# Patient Record
Sex: FEMALE | Race: BLACK OR AFRICAN AMERICAN | Employment: FULL TIME | URBAN - METROPOLITAN AREA
[De-identification: names, ages, dates, MRNs, and addresses within clinical notes are randomized per-mention and may not be internally consistent; named-entity substitution may affect disease eponyms.]

---

## 2020-11-11 ENCOUNTER — TELEPHONE (OUTPATIENT)
Dept: INTERNAL MEDICINE CLINIC | Age: 60
End: 2020-11-11

## 2020-11-11 NOTE — TELEPHONE ENCOUNTER
Eva Negron would like to know if DR Judi Bach will see his wife as a NP. She is not currently having any issues, just needs to establish with a PCP. She has [employer] insurance.

## 2020-12-11 ENCOUNTER — OFFICE VISIT (OUTPATIENT)
Dept: INTERNAL MEDICINE CLINIC | Age: 60
End: 2020-12-11
Payer: COMMERCIAL

## 2020-12-11 VITALS
WEIGHT: 77 LBS | SYSTOLIC BLOOD PRESSURE: 104 MMHG | HEART RATE: 68 BPM | DIASTOLIC BLOOD PRESSURE: 66 MMHG | TEMPERATURE: 96.2 F

## 2020-12-11 PROBLEM — H10.13 ALLERGIC CONJUNCTIVITIS OF BOTH EYES: Status: ACTIVE | Noted: 2020-12-11

## 2020-12-11 PROCEDURE — 99203 OFFICE O/P NEW LOW 30 MIN: CPT | Performed by: INTERNAL MEDICINE

## 2020-12-11 RX ORDER — OLOPATADINE HYDROCHLORIDE 1 MG/ML
1 SOLUTION/ DROPS OPHTHALMIC 2 TIMES DAILY
Qty: 5 ML | Refills: 5 | Status: SHIPPED | OUTPATIENT
Start: 2020-12-11 | End: 2021-01-10

## 2020-12-11 ASSESSMENT — PATIENT HEALTH QUESTIONNAIRE - PHQ9
SUM OF ALL RESPONSES TO PHQ QUESTIONS 1-9: 0
1. LITTLE INTEREST OR PLEASURE IN DOING THINGS: 0
SUM OF ALL RESPONSES TO PHQ9 QUESTIONS 1 & 2: 0
SUM OF ALL RESPONSES TO PHQ QUESTIONS 1-9: 0
2. FEELING DOWN, DEPRESSED OR HOPELESS: 0
SUM OF ALL RESPONSES TO PHQ QUESTIONS 1-9: 0

## 2020-12-11 NOTE — PROGRESS NOTES
2020    Jaimie Kuhn (:  1960) is a 64 y.o. female, here for evaluation of the following medical concerns:    Eye Problem   Both eyes are affected. This is a chronic problem. The current episode started more than 1 month ago. The problem has been gradually worsening. There was no injury mechanism. The pain is at a severity of 3/10. There is no known exposure to pink eye. She does not wear contacts. Associated symptoms include an eye discharge, eye redness and itching. Associated symptoms comments: pruritis. She has tried nothing for the symptoms. The treatment provided mild relief. Review of Systems   Constitutional: Negative for chills and diaphoresis. HENT: Negative for drooling, ear discharge and ear pain. Eyes: Positive for discharge, redness and itching. Respiratory: Negative for cough, choking and shortness of breath. Endocrine: Negative for heat intolerance and polydipsia. Genitourinary: Negative for enuresis and flank pain. Prior to Visit Medications    Medication Sig Taking? Authorizing Provider   olopatadine (PATANOL) 0.1 % ophthalmic solution Place 1 drop into both eyes 2 times daily Yes Bola Barrett MD        Allergies   Allergen Reactions    Penicillins        History reviewed. No pertinent past medical history.     Past Surgical History:   Procedure Laterality Date     SECTION      4 c-sections       Social History     Socioeconomic History    Marital status:      Spouse name: Not on file    Number of children: Not on file    Years of education: Not on file    Highest education level: Not on file   Occupational History    Not on file   Social Needs    Financial resource strain: Not on file    Food insecurity     Worry: Not on file     Inability: Not on file    Transportation needs     Medical: Not on file     Non-medical: Not on file   Tobacco Use    Smoking status: Never Smoker    Smokeless tobacco: Never Used Substance and Sexual Activity    Alcohol use: Not Currently    Drug use: Never    Sexual activity: Not on file   Lifestyle    Physical activity     Days per week: Not on file     Minutes per session: Not on file    Stress: Not on file   Relationships    Social connections     Talks on phone: Not on file     Gets together: Not on file     Attends Muslim service: Not on file     Active member of club or organization: Not on file     Attends meetings of clubs or organizations: Not on file     Relationship status: Not on file    Intimate partner violence     Fear of current or ex partner: Not on file     Emotionally abused: Not on file     Physically abused: Not on file     Forced sexual activity: Not on file   Other Topics Concern    Not on file   Social History Narrative    Not on file        Family History   Problem Relation Age of Onset    High Blood Pressure Mother     Heart Disease Father     High Blood Pressure Maternal Grandmother     High Blood Pressure Maternal Grandfather        Vitals:    12/11/20 1651   BP: 104/66   Site: Left Upper Arm   Position: Sitting   Cuff Size: Small Adult   Pulse: 68   Temp: 96.2 °F (35.7 °C)   TempSrc: Infrared   Weight: 77 lb (34.9 kg)     There is no height or weight on file to calculate BMI. Physical Exam  Constitutional:       General: She is not in acute distress. Appearance: Normal appearance. She is not ill-appearing. HENT:      Head: Normocephalic and atraumatic. Right Ear: Tympanic membrane and ear canal normal.      Left Ear: Tympanic membrane and ear canal normal.      Nose: Congestion and rhinorrhea present. Mouth/Throat:      Pharynx: No oropharyngeal exudate or posterior oropharyngeal erythema. Eyes:      General: Lids are normal. Lids are everted, no foreign bodies appreciated. Right eye: Discharge present. Left eye: Discharge present. Extraocular Movements:      Right eye: Normal extraocular motion. Left eye: Normal extraocular motion. Conjunctiva/sclera:      Right eye: Chemosis present. Left eye: Chemosis present. Neck:      Musculoskeletal: Normal range of motion. No neck rigidity or muscular tenderness. Cardiovascular:      Rate and Rhythm: Normal rate and regular rhythm. Pulses: Normal pulses. Heart sounds: No murmur. Pulmonary:      Effort: Pulmonary effort is normal. No respiratory distress. Breath sounds: Normal breath sounds. No stridor. No wheezing or rhonchi. Abdominal:      General: Abdomen is flat. There is no distension. Palpations: There is no mass. Tenderness: There is no abdominal tenderness. Hernia: No hernia is present. Skin:     General: Skin is warm. Coloration: Skin is not jaundiced or pale. Neurological:      Mental Status: She is alert. ASSESSMENT/PLAN:  1. Allergic conjunctivitis of both eyes  stable  - olopatadine (PATANOL) 0.1 % ophthalmic solution; Place 1 drop into both eyes 2 times daily  Dispense: 5 mL; Refill: 5  -  Refer to optometry      Return in about 3 months (around 3/11/2021) for Annual Physical.    An  electronic signature was used to authenticate this note.     --Hardik Rendon MD on 12/24/2020 at 7:20 AM

## 2020-12-24 ASSESSMENT — ENCOUNTER SYMPTOMS
COUGH: 0
CHOKING: 0
EYE ITCHING: 1
EYE REDNESS: 1
EYE DISCHARGE: 1
SHORTNESS OF BREATH: 0

## 2022-05-20 ENCOUNTER — HOSPITAL ENCOUNTER (EMERGENCY)
Age: 62
Discharge: HOME | End: 2022-05-20
Payer: COMMERCIAL

## 2022-05-20 VITALS
SYSTOLIC BLOOD PRESSURE: 111 MMHG | TEMPERATURE: 98.2 F | RESPIRATION RATE: 16 BRPM | WEIGHT: 84.5 LBS | OXYGEN SATURATION: 99 % | HEIGHT: 62 IN | DIASTOLIC BLOOD PRESSURE: 75 MMHG | BODY MASS INDEX: 15.55 KG/M2 | HEART RATE: 88 BPM

## 2022-05-20 DIAGNOSIS — S01.81XA CHIN LACERATION, INITIAL ENCOUNTER: Primary | ICD-10-CM

## 2022-05-20 PROCEDURE — 6360000002 HC RX W HCPCS: Performed by: PHYSICIAN ASSISTANT

## 2022-05-20 PROCEDURE — 90471 IMMUNIZATION ADMIN: CPT | Performed by: PHYSICIAN ASSISTANT

## 2022-05-20 PROCEDURE — 90715 TDAP VACCINE 7 YRS/> IM: CPT | Performed by: PHYSICIAN ASSISTANT

## 2022-05-20 PROCEDURE — 99284 EMERGENCY DEPT VISIT MOD MDM: CPT

## 2022-05-20 PROCEDURE — 12014 RPR F/E/E/N/L/M 5.1-7.5 CM: CPT

## 2022-05-20 RX ADMIN — TETANUS TOXOID, REDUCED DIPHTHERIA TOXOID AND ACELLULAR PERTUSSIS VACCINE, ADSORBED 0.5 ML: 5; 2.5; 8; 8; 2.5 SUSPENSION INTRAMUSCULAR at 19:07

## 2022-05-20 ASSESSMENT — ENCOUNTER SYMPTOMS
NAUSEA: 0
ABDOMINAL PAIN: 0
SHORTNESS OF BREATH: 0
DIARRHEA: 0
TROUBLE SWALLOWING: 0
VOICE CHANGE: 0
VOMITING: 0

## 2022-05-21 NOTE — ED PROVIDER NOTES
[facility]        Pt Name: Adrian Crum  MRN: [id number]  [place] 1960  Date of evaluation: 5/20/2022  Provider: Aisha Malagon PA-C  PCP: Del Danielle MD  Note Started: 8:00 PM EDT       SHARON. I have evaluated this patient. My supervising physician was available for consultation. CHIEF COMPLAINT       Chief Complaint   Patient presents with    Laceration     Tripped over a basket at work at [employer] in [facility] and fell and lacerated chin. HISTORY OF PRESENT ILLNESS   (Location, Timing/Onset, Context/Setting, Quality, Duration, Modifying Factors, Severity, Associated Signs and Symptoms)  Note limiting factors. Chief Complaint: chin laceration     Adrian Crum is a 63 y.o. female who presents to the emergency department today for evaluation for a chin laceration which occurred shortly before arriving to the ED. The patient states that she was at [address] [place] [address] [place], she states that she tripped over a basket on the floor, and fell hitting her chin on the ground. Patient denies feeling dizzy, lightheaded, having chest pain or shortness of breath before her fall, her fall was mechanical in nature. She did hit her head however there was no loss of consciousness. No vomiting. She is not on any blood thinners. She denies any headaches or facial pain. She has no visual changes. She has no numbness, tingling or weakness. She has no neck pain. No back pain. She denies feeling dizzy or lightheaded she is able to ambulate, unsure of her last tetanus, no other complaints. Nursing Notes were all reviewed and agreed with or any disagreements were addressed in the HPI. REVIEW OF SYSTEMS    (2-9 systems for level 4, 10 or more for level 5)     Review of Systems   Constitutional: Negative for activity change, appetite change, chills and fever. HENT: Negative for trouble swallowing and voice change. Eyes: Negative for visual disturbance. Respiratory: Negative for shortness of breath. Cardiovascular: Negative for chest pain. Gastrointestinal: Negative for abdominal pain, diarrhea, nausea and vomiting. Genitourinary: Negative for difficulty urinating, dysuria and hematuria. Musculoskeletal: Negative for neck pain and neck stiffness. Skin: Positive for wound. Neurological: Negative for weakness, numbness and headaches. Positives and Pertinent negatives as per HPI. Except as noted above in the ROS, all other systems were reviewed and negative. PAST MEDICAL HISTORY   History reviewed. No pertinent past medical history. SURGICAL HISTORY     Past Surgical History:   Procedure Laterality Date     SECTION      4 c-sections         CURRENTMEDICATIONS       Previous Medications    No medications on file         ALLERGIES     Penicillins    FAMILYHISTORY       Family History   Problem Relation Age of Onset    High Blood Pressure Mother     Heart Disease Father     High Blood Pressure Maternal Grandmother     High Blood Pressure Maternal Grandfather           SOCIAL HISTORY       Social History     Tobacco Use    Smoking status: Never Smoker    Smokeless tobacco: Never Used   Vaping Use    Vaping Use: Never used   Substance Use Topics    Alcohol use: Not Currently    Drug use: Never       SCREENINGS    Mongo Coma Scale  Eye Opening: Spontaneous  Best Verbal Response: Oriented  Best Motor Response: Obeys commands  Landon Coma Scale Score: 15        PHYSICAL EXAM    (up to 7 for level 4, 8 or more for level 5)     ED Triage Vitals [22 1858]   BP Temp Temp Source Pulse Resp SpO2 Height Weight   111/75 98.2 °F (36.8 °C) Oral 88 16 99 % 5' 2\" (1.575 m) 84 lb 8 oz (38.3 kg)       Physical Exam  Vitals and nursing note reviewed. Constitutional:       Appearance: She is well-developed. She is not diaphoretic. HENT:      Head: Normocephalic and atraumatic. Comments: There is no salinas sign or raccoon sign. No CSF rhinorrhea. To the underside of the chin there is a 6 cm laceration noted, gaping although it does approximate well. There is no facial bone tenderness. No salinas sign raccoon sign. No CSF rhinorrhea     Right Ear: External ear normal.      Left Ear: External ear normal.      Nose: Nose normal.      Mouth/Throat:      Mouth: Mucous membranes are moist.      Pharynx: Oropharynx is clear. Eyes:      General:         Right eye: No discharge. Left eye: No discharge. Extraocular Movements: Extraocular movements intact. Conjunctiva/sclera: Conjunctivae normal.      Pupils: Pupils are equal, round, and reactive to light. Neck:      Trachea: No tracheal deviation. Cardiovascular:      Rate and Rhythm: Normal rate and regular rhythm. Heart sounds: No murmur heard. Pulmonary:      Effort: Pulmonary effort is normal. No respiratory distress. Breath sounds: Normal breath sounds. No wheezing or rales. Musculoskeletal:         General: Normal range of motion. Cervical back: Normal range of motion and neck supple. Comments: There is no midline spinal tenderness   Skin:     General: Skin is warm and dry. Neurological:      General: No focal deficit present. Mental Status: She is alert and oriented to person, place, and time. Psychiatric:         Behavior: Behavior normal.         DIAGNOSTIC RESULTS   LABS:    Labs Reviewed - No data to display    When ordered only abnormal lab results are displayed. All other labs were within normal range or not returned as of this dictation. EKG: When ordered, EKG's are interpreted by the Emergency Department Physician in the absence of a cardiologist.  Please see their note for interpretation of EKG.     RADIOLOGY:   Non-plain film images such as CT, Ultrasound and MRI are read by the radiologist. Plain radiographic images are visualized and preliminarily interpreted by the ED Provider with the below findings:        Interpretation per the Radiologist below, if available at the time of this note:    No orders to display     No results found. PROCEDURES   Unless otherwise noted below, none     Lac Repair    Date/Time: 5/20/2022 8:32 PM  Performed by: Edy Bui PA-C  Authorized by: Edy Bui PA-C     Consent:     Consent obtained:  Verbal    Consent given by:  Patient    Risks discussed:  Infection    Alternatives discussed:  No treatment  Anesthesia (see MAR for exact dosages): Anesthesia method:  Local infiltration    Local anesthetic:  Lidocaine 1% WITH epi  Laceration details:     Location:  Face    Face location:  Chin    Length (cm):  6    Depth (mm):  3  Repair type:     Repair type:  Simple  Pre-procedure details:     Preparation:  Patient was prepped and draped in usual sterile fashion  Exploration:     Wound extent: no foreign bodies/material noted, no muscle damage noted, no underlying fracture noted and no vascular damage noted      Contaminated: no    Treatment:     Area cleansed with:  Saline    Amount of cleaning:  Standard    Irrigation solution:  Sterile saline    Visualized foreign bodies/material removed: no    Skin repair:     Repair method:  Sutures    Suture size:  6-0    Suture material:  Nylon    Suture technique:  Simple interrupted    Number of sutures:  6  Approximation:     Approximation:  Close  Post-procedure details:     Dressing:  Open (no dressing)    Patient tolerance of procedure:   Tolerated well, no immediate complications        CRITICAL CARE TIME       CONSULTS:  None      EMERGENCY DEPARTMENT COURSE and DIFFERENTIAL DIAGNOSIS/MDM:   Vitals:    Vitals:    05/20/22 1858   BP: 111/75   Pulse: 88   Resp: 16   Temp: 98.2 °F (36.8 °C)   TempSrc: Oral   SpO2: 99%   Weight: 84 lb 8 oz (38.3 kg)   Height: 5' 2\" (1.575 m)       Patient was given the following medications:  Medications   Tetanus-Diphth-Acell Pertussis (BOOSTRIX) injection 0.5 mL (0.5 mLs IntraMUSCular Given 5/20/22 1906)         Is this patient to be included in the SEP-1 Core Measure due to severe sepsis or septic shock? No   Exclusion criteria - the patient is NOT to be included for SEP-1 Core Measure due to: Infection is not suspected    Briefly, this is a 82-year-old female who presents to the emergency department today for evaluation for chin laceration which occurred today before arriving to the ED. Patient states that she was at [employer], tripped over a basket, fell landing directly on her chin. No loss of consciousness. No vomiting. She is not on any blood thinners    On examination, she has a 6 cm laceration noted to the underside of the chin, she is otherwise neurovascularly intact, and she has no tenderness    The laceration was repaired with 6 sutures by myself, please see procedure note above for additional details    Supportive care discussed at home including wound care, suture removal within 5 days. She is to return to the ED for any new or worsening symptoms. Patient voiced understanding is agreeable with plan. Stable for discharge. No results found for this visit on 05/20/22. I estimate there is LOW risk for SUBARACHNOID HEMORRHAGE, MENINGITIS, INTRACRANIAL HEMORRHAGE, SUBDURAL HEMATOMA, OR STROKE, thus I consider the discharge disposition reasonable. Adrian Crum and I have discussed the diagnosis and risks, and we agree with discharging home to follow-up with their primary doctor. We also discussed returning to the Emergency Department immediately if new or worsening symptoms occur. We have discussed the symptoms which are most concerning (e.g., changing or worsening pain, weakness, vomiting, fever) that necessitate immediate return. Final Impression    1. Chin laceration, initial encounter        Discharge Vital Signs:  Blood pressure 111/75, pulse 88, temperature 98.2 °F (36.8 °C), temperature source Oral, resp.  rate 16, height 5' 2\" (1.575 m), weight 84 lb 8 oz (38.3 kg), SpO2 99 %. [place] Criteria for Head CT Imaging  Imaging is indicated if any of the following are present:  GCS < 15 two hours after injury: No  Suspected open/depressed skull fracture: No  Signs of basilar skull fracture: No  Two or more episodes of vomiting since injury: No  Age 72 years or older: No  Amnesia of time before impact: No  Dangerous mechanism (pedestrian struck by motor vehicle, ejected from MVC, fall >3ft or >5 steps): No  Neurologic deficits on exam: No  Seizures after injury: No  Bleeding diathesis or anticoagulant use: No    Based on the [facility] HCT rules, head CT imaging is not indicated at this time. FINAL IMPRESSION      1.  Chin laceration, initial encounter          DISPOSITION/PLAN   DISPOSITION Decision To Discharge 05/20/2022 07:26:03 PM      PATIENT REFERRED TO:  Hardik Rendon, [address] [facility] [address] [facility] [facility]  [phone]    In 5 days  For suture removal    [facility] Emergency Department  [address] [place] [address]  [phone]    As needed, If symptoms worsen      DISCHARGE MEDICATIONS:  New Prescriptions    No medications on file       DISCONTINUED MEDICATIONS:  Discontinued Medications    No medications on file              (Please note that portions of this note were completed with a voice recognition program.  Efforts were made to edit the dictations but occasionally words are mis-transcribed.)    Lucretia Hutchinson PA-C (electronically signed)            Lucretia Hutchinson PA-C  05/20/22 2034

## 2022-07-06 ENCOUNTER — HOSPITAL ENCOUNTER (EMERGENCY)
Age: 62
Discharge: HOME | End: 2022-07-06
Payer: COMMERCIAL

## 2022-07-06 ENCOUNTER — APPOINTMENT (OUTPATIENT)
Dept: GENERAL RADIOLOGY | Age: 62
End: 2022-07-06
Payer: COMMERCIAL

## 2022-07-06 VITALS
TEMPERATURE: 97.9 F | DIASTOLIC BLOOD PRESSURE: 62 MMHG | OXYGEN SATURATION: 95 % | HEART RATE: 76 BPM | SYSTOLIC BLOOD PRESSURE: 112 MMHG | RESPIRATION RATE: 16 BRPM

## 2022-07-06 DIAGNOSIS — S32.592A CLOSED FRACTURE OF MULTIPLE PUBIC RAMI, LEFT, INITIAL ENCOUNTER (HCC): Primary | ICD-10-CM

## 2022-07-06 DIAGNOSIS — S39.012A LUMBAR STRAIN, INITIAL ENCOUNTER: ICD-10-CM

## 2022-07-06 PROCEDURE — 73552 X-RAY EXAM OF FEMUR 2/>: CPT

## 2022-07-06 PROCEDURE — 99283 EMERGENCY DEPT VISIT LOW MDM: CPT

## 2022-07-06 PROCEDURE — 72100 X-RAY EXAM L-S SPINE 2/3 VWS: CPT

## 2022-07-06 RX ORDER — HYDROCODONE BITARTRATE AND ACETAMINOPHEN 5; 325 MG/1; MG/1
1 TABLET ORAL EVERY 6 HOURS PRN
Qty: 20 TABLET | Refills: 0 | Status: SHIPPED | OUTPATIENT
Start: 2022-07-06 | End: 2022-07-11

## 2022-07-06 ASSESSMENT — ENCOUNTER SYMPTOMS: BACK PAIN: 1

## 2022-07-06 NOTE — Clinical Note
Saima Stoll was seen and treated in our emergency department on 7/6/2022. She may return to work on 07/20/2022. If you have any questions or concerns, please don't hesitate to call.       Jasmin Alvarez PA-C

## 2022-07-06 NOTE — Clinical Note
Thiago Johnson was seen and treated in our emergency department on 7/6/2022. She may return to work on 07/20/2022. If you have any questions or concerns, please don't hesitate to call.       Monisha Eubanks PA-C

## 2022-07-06 NOTE — ED PROVIDER NOTES
history. SURGICAL HISTORY     Past Surgical History:   Procedure Laterality Date     SECTION      4 c-sections         CURRENTMEDICATIONS       Previous Medications    No medications on file         ALLERGIES     Penicillins    FAMILYHISTORY       Family History   Problem Relation Age of Onset    High Blood Pressure Mother     Heart Disease Father     High Blood Pressure Maternal Grandmother     High Blood Pressure Maternal Grandfather           SOCIAL HISTORY       Social History     Tobacco Use    Smoking status: Never Smoker    Smokeless tobacco: Never Used   Vaping Use    Vaping Use: Never used   Substance Use Topics    Alcohol use: Not Currently    Drug use: Never       SCREENINGS             PHYSICAL EXAM    (up to 7 for level 4, 8 or more for level 5)     ED Triage Vitals [22 1315]   BP Temp Temp Source Heart Rate Resp SpO2 Height Weight   112/62 97.9 °F (36.6 °C) Oral 76 16 95 % -- --       Physical Exam  Vitals and nursing note reviewed. Constitutional:       General: She is not in acute distress. Appearance: She is not ill-appearing or toxic-appearing. HENT:      Head: Normocephalic and atraumatic. Right Ear: External ear normal.      Left Ear: External ear normal.      Nose: Nose normal.      Mouth/Throat:      Mouth: Mucous membranes are moist.      Pharynx: Oropharynx is clear. Eyes:      Extraocular Movements: Extraocular movements intact. Conjunctiva/sclera: Conjunctivae normal.      Pupils: Pupils are equal, round, and reactive to light. Cardiovascular:      Rate and Rhythm: Normal rate and regular rhythm. Pulses: Normal pulses. Heart sounds: Normal heart sounds. Pulmonary:      Effort: Pulmonary effort is normal. No respiratory distress. Breath sounds: Normal breath sounds. Abdominal:      General: Abdomen is flat. Bowel sounds are normal. There is no distension. Palpations: Abdomen is soft. Tenderness:  There is no abdominal tenderness. There is no guarding or rebound. Musculoskeletal:         General: Tenderness (lateral left hip and knee) present. No deformity. Normal range of motion. Cervical back: Normal range of motion and neck supple. Lumbar back: Tenderness present. No deformity. Normal range of motion. Negative right straight leg raise test and negative left straight leg raise test.   Skin:     General: Skin is warm and dry. Capillary Refill: Capillary refill takes less than 2 seconds. Neurological:      General: No focal deficit present. Mental Status: She is alert and oriented to person, place, and time. Cranial Nerves: No cranial nerve deficit. Sensory: No sensory deficit. Motor: No weakness. Coordination: Coordination normal.      Gait: Gait normal.   Psychiatric:         Mood and Affect: Mood normal.         Behavior: Behavior normal.         DIAGNOSTIC RESULTS   LABS:    Labs Reviewed - No data to display    When ordered only abnormal lab results are displayed. All other labs were within normal range or not returned as of this dictation. EKG: When ordered, EKG's are interpreted by the Emergency Department Physician in the absence of a cardiologist.  Please see their note for interpretation of EKG. RADIOLOGY:   Non-plain film images such as CT, Ultrasound and MRI are read by the radiologist. Plain radiographic images are visualized and preliminarily interpreted by the ED Provider with the below findings:        Interpretation per the Radiologist below, if available at the time of this note:    XR LUMBAR SPINE (2-3 VIEWS)   Final Result   No acute osseous abnormality of the lumbar spine. Osteopenia. XR FEMUR LEFT (MIN 2 VIEWS)   Final Result   Left pubic rami fractures. No acute osseous abnormality of the left femur. No results found.         PROCEDURES   Unless otherwise noted below, none     Procedures    CRITICAL CARE TIME       CONSULTS:  IP CONSULT TO ORTHOPEDIC SURGERY    1500 - case d/w Jones Aylatarik from orthopedics, agrees pt ok for d/c as she is ambulatory. recs walker for pt, f/u in 2 weeks. EMERGENCY DEPARTMENT COURSE and DIFFERENTIAL DIAGNOSIS/MDM:   Vitals:    Vitals:    07/06/22 1315   BP: 112/62   Pulse: 76   Resp: 16   Temp: 97.9 °F (36.6 °C)   TempSrc: Oral   SpO2: 95%       Patient was given the following medications:  Medications - No data to display      Is this patient to be included in the SEP-1 Core Measure due to severe sepsis or septic shock? No   Exclusion criteria - the patient is NOT to be included for SEP-1 Core Measure due to: Infection is not suspected    Pt ambulatory here. Neuro intact. No head injury, neck pain/midline TTP or deformity. Case d/w ortho, recs given. Pt instructed to f/u with ortho, return for any new or worsening symptoms. FINAL IMPRESSION      1. Closed fracture of multiple pubic rami, left, initial encounter ([facility] [address]    2. Lumbar strain, initial encounter          DISPOSITION/PLAN   DISPOSITION Decision To Discharge 07/06/2022 03:40:42 PM      PATIENT REFERRED TO:  Romy Shaw MD  [address] [address]  [place]   [phone]    Schedule an appointment as soon as possible for a visit in 2 weeks  regarding the pelvis      DISCHARGE MEDICATIONS:  New Prescriptions    HYDROCODONE-ACETAMINOPHEN (NORCO) 5-325 MG PER TABLET    Take 1 tablet by mouth every 6 hours as needed for Pain for up to 5 days. Intended supply: 5 days. Take lowest dose possible to manage pain       DISCONTINUED MEDICATIONS:  Discontinued Medications    No medications on file              (Please note that portions of this note were completed with a voice recognition program.  Efforts were made to edit the dictations but occasionally words are mis-transcribed. )    Marni Mckeon PA-C (electronically signed)            Marni Mckeon PA-C  07/06/22 1343

## 2022-07-06 NOTE — Clinical Note
Juli Genao was seen and treated in our emergency department on 7/6/2022. She may return to work on 07/20/2022. If you have any questions or concerns, please don't hesitate to call.       Kwesi Luther PA-C

## 2022-07-06 NOTE — ED NOTES
Pt discharged home, 5 Sutures removed from chin. Pt going home with walker and crutches. Understands follow up with orthopedic. Phone number on discharge instructions.        Lina Shultz RN  07/06/22 4215

## 2022-07-07 ENCOUNTER — TELEPHONE (OUTPATIENT)
Dept: ORTHOPEDIC SURGERY | Age: 62
End: 2022-07-07

## 2022-07-07 NOTE — TELEPHONE ENCOUNTER
Patient referred for femur fx from ED. Dr Evone Sandifer on call. Lm on v/m offering 11:00 on 7/7 at [facility] office.      Please schedule at this date and time when patient calls back

## 2022-07-20 ENCOUNTER — OFFICE VISIT (OUTPATIENT)
Dept: ORTHOPEDIC SURGERY | Age: 62
End: 2022-07-20
Payer: COMMERCIAL

## 2022-07-20 VITALS — HEIGHT: 62 IN | WEIGHT: 84 LBS | BODY MASS INDEX: 15.46 KG/M2

## 2022-07-20 DIAGNOSIS — M25.559 HIP PAIN: ICD-10-CM

## 2022-07-20 DIAGNOSIS — S32.592A FRACTURE OF MULTIPLE PUBIC RAMI, LEFT, CLOSED, INITIAL ENCOUNTER (HCC): Primary | ICD-10-CM

## 2022-07-20 PROCEDURE — G8419 CALC BMI OUT NRM PARAM NOF/U: HCPCS | Performed by: ORTHOPAEDIC SURGERY

## 2022-07-20 PROCEDURE — G8427 DOCREV CUR MEDS BY ELIG CLIN: HCPCS | Performed by: ORTHOPAEDIC SURGERY

## 2022-07-20 PROCEDURE — 99204 OFFICE O/P NEW MOD 45 MIN: CPT | Performed by: ORTHOPAEDIC SURGERY

## 2022-07-20 PROCEDURE — 1036F TOBACCO NON-USER: CPT | Performed by: ORTHOPAEDIC SURGERY

## 2022-07-20 PROCEDURE — 3017F COLORECTAL CA SCREEN DOC REV: CPT | Performed by: ORTHOPAEDIC SURGERY

## 2022-07-20 RX ORDER — MELATONIN
1000 DAILY
Qty: 90 TABLET | Refills: 1 | Status: SHIPPED | OUTPATIENT
Start: 2022-07-20

## 2022-07-20 NOTE — LETTER
[address]  [address] [facility] [address]  Phone: [phone]  Fax: [phone]    Gina Novoa MD        July 20, 2022     Patient: Eleuterio Benavides   YOB: 1960   Date of Visit: 7/20/2022       To Whom It May Concern: It is my medical opinion that Eleuterio Benavides should remain out of work for 1 month. If you have any questions or concerns, please don't hesitate to call.     Sincerely,          Gina Novoa MD

## 2022-07-20 NOTE — LETTER
Physical Therapy Rehabilitation Referral    Patient Name: Aileen Lauren MRN: [id number]  DOS: 7/20/2022     Diagnosis:   1. Fracture of multiple pubic rami, left, closed, [facility]    2.  Hip pain            Precautions:     [x] Evaluate and Treat    Post Op Instructions:  [] 20 lb flat foot weight bearing x 3 weeks with crutches  [] 20 lb flat foot weight bearing x 4 weeks with crutches (cartilage repair protocol)  [] Weight bearing as tolerated x 2 weeks with crutches  [] No active abduction x 6 weeks (abductor repair protocol)  [] Continuous passive motion (CPM)   [] AAROM: Flexion to 90   [] Uni-planar passive range of motion   [] AAROM: External rotation to 10   [] Hip brace on at all times    [] AAROM: Extension to 10   [] Hip brace when hip at risk     [] AAROM:  Neutral IR   [] Home exercise program (copy to patient)   [] AAROM: Abduction to 25    [] Isometric external rotator strengthening    [] Isometric glute max strengthening     [] Isometric abductor strengthening     [] Leg Circumduction (PT and family member assisted x 3 weeks)                 Post-op Phases:  []Phase I: Maximum Protection (Day 1-3 Weeks)  []Phase II: Mobility & Neuromuscular Retraining (3-6 Weeks)  []Phase III: Phase III: Muscle Balance and Strengthening (6-12 Weeks)  []Phase IV: Functional Training of the Hip & Lower Extremity (12-18 Weeks)  []Phase V: Advanced Training - Specificity for Return to Sport and/or Work (18-[employer])    Non-Operative & Pre-Operative Rehabilitation:    Cardiovascular:            Deep Hip Rotators  [x] Upright Bike (Baseline)           [x] External Rotators AROM in 4PK (Baseline)  [] Walking (Progression 1)           [x] Internal Rotators AROM in 4PK (Baseline)  [] Running (Progression 2)           [] ER in side lying (Progression 1)  [] Dynamic Loading (Progression 3)          [] IR in side lying (Progression 1)                [] ER with resistance in 4PK (Progression 2)                [] IR with resistance in 4PK (Progression 2)                 [] Lateral Step Up (Progression 3)    Positioning:  [x] 4PK with pelvic tilts (Baseline)  [] Pelvic tilts in sitting (Progression 1)      Core:   [x] Relaxation Breathing (Baseline)         [] [place] lying elbow presses (Progression 1)  [x] Side Planks (Baseline)         [] Side planks + hip abduction (Progression 1)  [x] Bird-Dog (Baseline)            [] Bird-Dog with resistance (Progression 1)    Glute Complex:            Load Transfer:  [x] Bridging (Baseline)                [x] Weight Shifting (Baseline)     [] Single Leg Bridge (Progression 1)        [] Single Leg Stance to SEBT(Progression 1)     [] Single Leg Lower (Progression 2)         [] Hip hinge + monster walks (Progression 2)       Mobility:  [x] Jake position with breathing (baseline)  [x] Hip Hinge with stick & neutral spine (baseline)  [x] Sit to stand (baseline)  [] Hip Hinge without guidance (Progression 1)  [] Hip Hinge with resisted punch out guidance (Progression 2)      Pelvic Floor:  [] Relaxation breathing         Activities:       [] Rowing       [] Stepper/Exercise bike     [] Swimming  [] Water exercises    Modalities:     Return to Sport:  [x] Of Choice      [] Plyometrics  [] Ultrasound     [] Rhythmic stabilization  [] Iontophoresis    [] Core strengthening   [] Moist heat     [] Sports specific program:   [] Massage         [x] Cryotherapy      [] Electrical stimulation     [] Paraffin  [] Whirlpool  [] TENS    [x] Home exercise program (copy to patient).         Perform exercises for:   15     minutes    3      times/day  [x] Supervised physical therapy  Frequency: []  1x week  [x] 2x week  [] 3x week  [] Other:   Duration: [] 2 weeks   [] 4 weeks  [x] 6 weeks  [] Other:     Additional Instructions:         Sincerely,    Clare Barahona MD [address] [facility] [address] [place], [address], [address]  Email: [email or web address]  Office: [phone]    07/20/22  4:13 PM

## 2022-07-20 NOTE — PROGRESS NOTES
Date:  2022    Name:  Rosario Lara  Address:  [address] [address]    :  1960      Age:   63 y.o.    SSN:  [id number]      Medical Record Number:  [id number]    Reason for Visit:    Chief Complaint    Hip Pain (NP LEFT HIP)      DOS:2022     HPI: Rosario Lara is a 63 y.o. female here today for  evaluation of bilateral anterior  and groin pain that has been on going for  2 week(s). She stepped up on a chair at home to reach for a cabinet, and it gave out from underneath her. She landed on her left hip. She went to ED and xrays revealed rami fracture. Pain assessment is documented below. The patient denies any bowel/bladder symptoms, or any numbness, tingling, or weakness down the affected thigh or leg. The patient denies any prior hip injuries, surgeries, or any childhood history of hip disorders. Rosario Lara is currently working Full Time as a  at [employer]. Pain Assessment  Location of Pain: Hip  Location Modifiers: Left  Severity of Pain: 4  Quality of Pain: Throbbing  Frequency of Pain: Intermittent  Relieving Factors: Rest, Ice, Heat, Nsaids  Result of Injury: Yes (FALL)  Work-Related Injury: No  Are there other pain locations you wish to document?: No  ROS: Review of systems reviewed from Patient History Form completed today and available in the patient's chart under the Media tab. History reviewed. No pertinent past medical history.      Past Surgical History:   Procedure Laterality Date     SECTION      4 c-sections       Family History   Problem Relation Age of Onset    High Blood Pressure Mother     Heart Disease Father     High Blood Pressure Maternal Grandmother     High Blood Pressure Maternal Grandfather        Social History     Socioeconomic History    Marital status:      Spouse name: None    Number of children: None    Years of education: None    Highest education level: None   Tobacco Use    Smoking status: Never Smokeless tobacco: Never   Vaping Use    Vaping Use: Never used   Substance and Sexual Activity    Alcohol use: Not Currently    Drug use: Never       No current outpatient medications on file. No current facility-administered medications for this visit. Allergies   Allergen Reactions    Penicillins        Vital signs:  Ht 5' 2\" (1.575 m)   Wt 84 lb (38.1 kg)   BMI 15.36 kg/m²        Constitutional: The physical examination finds the patient to be well-developed and well-nourished. The patient is alert and oriented x3 and was cooperative throughout the visit. Neuro: no focal deficits noted. Normal mood, judgement, decision making  Eyes: sclera clear, EOMI  Ears: Normal external ear  Mouth:  No perioral lesions  Pulm: Respirations unlabored and regular  Pulse: Extremities well perfused, warm, capillary refill < 2 seconds  Musculoskeletal:      Hip Examination: bilateral    Skin/Inspection: No skin lesions, cellulitis, or extreme edema in the lower extremities. Standing/Walking: abnormal: antalgic gait, positive Trendelenburg sign. Supine/Side Lying Exam: tender with pelvic lateral compression   full range with pain  FADIR Negative  FAITH Negative  Resisted Abduction  4/5   Resisted Adduction  5/5   Resisted  Flexion  5/5    not tender at greater trochanter  Ko Test: normal    Prone: Absent  hip flexion contracture   Non tender to the proximal hamstring   Tender to the left  sacro-iliac joints        Distal Neurovascular exam is intact (foot sensation, pulses, and motor exam)       Diagnostics:  Radiology:       Pertinent imaging reviewed, images only - no report available.     newRadiographs were obtained and reviewed in the office; 3 views: AP Pelvis and bilateral hip 45-deg Mcnair View, and left False Profile View    Tonnis Grade: grade 1  LCEA:  normal  Alpha Angle: 45 deg   normal     Impression: acute superior pubic ramus fracture       Assessment: Patient is a 63 y.o. female with pelvic pain consistent with an xray diagnosis of a type 1 lateral compression fracture (LC1). I suspect a concomitant in complete sacral insufficiency fracture, but no xray evidence of vertical instability at the sacrioiliac joints on xrays. Impression:  Visit Diagnoses         Codes    Fracture of multiple pubic rami, left, closed, initial encounter [facility])    -  Primary S32.592A    Hip pain     M25.559            Office Procedures:  No orders of the defined types were placed in this encounter. Orders Placed This Encounter   Procedures    XR HIP LEFT (2-3 VIEWS)     Standing Status:   Future     Number of Occurrences:   1     Standing Expiration Date:   7/20/2022    Ambulatory referral to Physical Therapy     Referral Priority:   Routine     Referral Type:   Eval and Treat     Referral Reason:   Specialty Services Required     Requested Specialty:   Physical Therapist     Number of Visits Requested:   1       Plan:  Pertinent imaging was reviewed. The etiology, natural history, and treatment options for the disorder were discussed. The roles of activity modification, antiinflammatory medicine, injections, bracing, physical therapy, and surgical interventions were all described to the patient and questions were answered. We believe patient is a candidate for non operative management and close radiographic follow-up. She can be WBAT. She needs to remain off work as a  for another 4 weeks or so. I recommend OTC tylenol for pain. She would benefit from 1x/weekly PT for gentle hip/pelvis/spine conditioning and gait re-training. I recommend evaluation by her PCP Dr Guillermina Hendricks and/or endocrinologist for osteoporosis screening and treatment, as she is at risk for future fragility fractures. We have started her preliminarily on 1000 IU vitamin D PO daily. All the patient's questions were answered while in the clinic.   The patient is understanding of all instructions and agrees with the plan.    Approximately 45 minutes was spent on patient education and coordinating care. Follow up in: Return in about 4 weeks (around 8/17/2022). + new pelvic xrays     Hip Self assessment forms      Sincerely,    Karthikeyan Malik MD [address] [facility]   [place] [address] [place] [address], [address]  Email: [email or web address]  Office: [phone]      07/20/22  5:43 PM    This dictation was performed with a verbal recognition program (DRAGON) and it was checked for errors. It is possible that there are still dictated errors within this office note. If so, please bring any errors to my attention for an addendum. All efforts were made to ensure that this office note is accurate.

## 2022-07-20 NOTE — LETTER
[address]  [address] [facility] [address]  Phone: [phone]  Fax: [phone]    Prince Mclean MD    July 20, 2022     Stephanie Rater, [address] [address] [facility]    Patient: Garcia Trudy   MR Number: [id number]   YOB: 1960   Date of Visit: 7/20/2022       Dear Stephanie Rater: Thank you for referring Garcia Trudy to me for evaluation/treatment. Below are the relevant portions of my assessment and plan of care. If you have questions, please do not hesitate to call me. I look forward to following Yarely Kojo along with you.     Sincerely,      Prince Mclean MD

## 2022-07-21 DIAGNOSIS — S32.592A FRACTURE OF MULTIPLE PUBIC RAMI, LEFT, CLOSED, INITIAL ENCOUNTER (HCC): Primary | ICD-10-CM

## 2022-08-11 ENCOUNTER — HOSPITAL ENCOUNTER (OUTPATIENT)
Dept: PHYSICAL THERAPY | Age: 62
Setting detail: THERAPIES SERIES
Discharge: HOME | End: 2022-08-11
Payer: COMMERCIAL

## 2022-08-11 PROCEDURE — 97530 THERAPEUTIC ACTIVITIES: CPT

## 2022-08-11 PROCEDURE — 97161 PT EVAL LOW COMPLEX 20 MIN: CPT

## 2022-08-11 NOTE — FLOWSHEET NOTE
[address] [facility]  Phone: [phone]   Fax: [phone]    Physical Therapy Treatment Note/ Progress Report:     Date:  2022    Patient Name:  Shmuel Russ    :  1960  MRN: [id number]  Restrictions/Precautions:    Medical/Treatment Diagnosis Information:  Diagnosis: M25.559 (ICD-10-CM) - Hip pain  S32.592A (ICD-10-CM) - Fracture of multiple pubic rami, left, closed, initial [facility]  Treatment Diagnosis: decreased strength, decreased mobility, impaired gait. Insurance/Certification information:  PT Insurance Information: Med Stamps  Physician Information:  Michael Batista MD    Plan of care signed (Y/N): []  Yes [x]  No     Date of Patient follow up with Physician:      Progress Report: []  Yes  [x]  No     Date Range for reporting period:  Beginnin22  PN:  Ending:     Progress report due (10 Rx/or 30 days whichever is less): visit #10 or     Recertification due (POC duration/ or 90 days whichever is less): visit #12 or 22    Visit # Insurance Allowable Auth required? Date Range   1/10 40, may be limited by high deductible []  Yes  [x]  No NA       Latex Allergy:  [x]NO      []YES  Preferred Language for Healthcare:   [x]English       []other:    Functional Scale:           Date assessed:  FOTO physical FS primary measure score = 32; risk adjusted = 61  22    Pain level:  4-6/10 intermittent    SUBJECTIVE:  See eval    OBJECTIVE: See eval  : uses crutches, able to ambulate independently with pain for short distances.      RESTRICTIONS/PRECAUTIONS: WBAT, possible osteoporosis, low BMI    Exercises/Interventions:     Therapeutic Exercise (36489)  Resistance / level Sets/sec Reps Notes / Cues          Seated stepper       LAQ  Seated marchCaro Nut                                                        Therapeutic Activities (68538)       // bars:  Marching  Hip 3 way mini squat  Lateral stepping Neuromuscular Re-ed (07459)       Tandem       rhomberg foam             Manual Intervention (60642)                                                     Modalities:     Pt. Education:  -pt educated on diagnosis, prognosis and expectations for rehab  -all pt questions were answered    Home Exercise Program:  NV    Therapeutic Exercise and NMR EXR  [] (17076) Provided verbal/tactile cueing for activities related to strengthening, flexibility, endurance, ROM for improvements in LE, proximal hip, and core control with self care, mobility, lifting, ambulation.  [] (53805) Provided verbal/tactile cueing for activities related to improving balance, coordination, kinesthetic sense, posture, motor skill, proprioception  to assist with LE, proximal hip, and core control in self care, mobility, lifting, ambulation and eccentric single leg control.   [] (12466) Therapist is in constant attendance of 2 or more patients providing skilled therapy interventions, but not providing any significant amount of measurable one-on-one time to either patient, for improvements in LE, proximal hip, and core control in self care, mobility, lifting, ambulation and eccentric single leg control.      NMR and Therapeutic Activities:    [] (81584 or 49434) Provided verbal/tactile cueing for activities related to improving balance, coordination, kinesthetic sense, posture, motor skill, proprioception and motor activation to allow for proper function of core, proximal hip and LE with self care and ADLs  [] (73107) Gait Re-education- Provided training and instruction to the patient for proper LE, core and proximal hip recruitment and positioning and eccentric body weight control with ambulation re-education including up and down stairs     Home Exercise Program:    [x] (89255) Reviewed/Progressed HEP activities related to strengthening, flexibility, endurance, ROM of core, proximal hip and LE for functional self-care, mobility, lifting and ambulation/stair navigation   [] (15027)Reviewed/Progressed HEP activities related to improving balance, coordination, kinesthetic sense, posture, motor skill, proprioception of core, proximal hip and LE for self care, mobility, lifting, and ambulation/stair navigation      Manual Treatments:  PROM / STM / Oscillations-Mobs:  G-I, II, III, IV (PA's, Inf., Post.)  [] (53027) Provided manual therapy to mobilize LE, proximal hip and/or LS spine soft tissue/joints for the purpose of modulating pain, promoting relaxation,  increasing ROM, reducing/eliminating soft tissue swelling/inflammation/restriction, improving soft tissue extensibility and allowing for proper ROM for normal function with self care, mobility, lifting and ambulation. Modalities:  [] (35499) Vasopneumatic compression: Utilized vasopneumatic compression to decrease edema / swelling for the purpose of improving mobility and quad tone / recruitment which will allow for increased overall function including but not limited to self-care, transfers, ambulation, and ascending / descending stairs. Charges:  Timed Code Treatment Minutes: 25   Total Treatment Minutes: 45     [x] EVAL - LOW (85851)   [] EVAL - MOD (37401)  [] EVAL - HIGH (25245)  [] RE-EVAL (76100)  [] BL(27175) x      [] Ionto  [] NMR (93576) x      [] Vaso  [] Manual (12422) x      [] Ultrasound  [x] TA x  2     [] Mech Traction (16538)  [] Aquatic Therapy x     [] ES (un) (19942):   [] Home Management Training x [] ES(attended) (02418)   [] Group:     [] Other:     GOALS:   Patient stated goal: Get strong again, and return to her normal self  [] Progressing: [] Met: [] Not Met: [] Adjusted     Therapist goals for Patient:  Short Term Goals: To be achieved in: 2 weeks  1. Independent in HEP and progression per patient tolerance, in order to prevent re-injury. [] Progressing: [] Met: [] Not Met: [] Adjusted  2.  Patient will have a decrease in pain to facilitate improvement in movement, function, and ADLs as indicated by Functional Deficits. [] Progressing: [] Met: [] Not Met: [] Adjusted     Long Term Goals: To be achieved in: 4-6 weeks  1. FOTO score of at least 62 (in 15 sessions) to assist with reaching prior level of function. [] Progressing: [] Met: [] Not Met: [] Adjusted  2. Patient will demonstrate an increase in Strength to at least  as well as good proximal hip strength and control to allow for proper functional mobility as indicated by patients Functional Deficits. [] Progressing: [] Met: [] Not Met: [] Adjusted  3. Patient will return to functional activities including walking 100' independently with normal gait mechanics and without increased symptoms or restriction. [] Progressing: [] Met: [] Not Met: [] Adjusted  4. Pt will complete bed mobility independently without the need to lift the L LE with her UEs. [] Progressing: [] Met: [] Not Met: [] Adjusted         Overall Progression Towards Functional goals/ Treatment Progress Update:  [] Patient is progressing as expected towards functional goals listed. [] Progression is slowed due to complexities/Impairments listed. [] Progression has been slowed due to co-morbidities.   [x] Plan just implemented, too soon to assess goals progression <30days   [] Goals require adjustment due to lack of progress  [] Patient is not progressing as expected and requires additional follow up with physician  [] Other    Persisting Functional Limitations/Impairments:  [x]Sitting [x]Standing   [x]Walking [x]Stairs   [x]Transfers [x]ADLs   [x]Squatting/bending [x]Kneeling  [x]Housework [x]Job related tasks  []Driving [x]Sports/Recreation   [x]Sleeping []Other:    ASSESSMENT:  See eval  Treatment/Activity Tolerance:  [] Pt able to complete treatment [] Patient limited by fatique  [x] Patient limited by pain  [] Patient limited by other medical complications  [] Other:     Prognosis: [] Good [x] Fair  [] Poor    Patient Requires Follow-up: [x] Yes  []

## 2022-08-11 NOTE — PLAN OF CARE
[address] [address], [address] [place] [address] Drive  Phone: [phone]   Fax: [phone]                                                       Physical Therapy Certification    Dear Polly Ghotra MD  ,    We had the pleasure of evaluating the following patient for physical therapy services at [address] [place] [address]. A summary of our findings can be found in the initial assessment below. This includes our plan of care. If you have any questions or concerns regarding these findings, please do not hesitate to contact me at the office phone number checked above. Thank you for the referral.       Physician Signature:_______________________________Date:__________________  By signing above (or electronic signature), therapists plan is approved by physician      Patient: Eleuterio Benavides   : 1960   MRN: [id number]  Referring Physician: Polly Ghotra MD        Evaluation Date: 2022      Medical Diagnosis Information:  Diagnosis: M25.559 (ICD-10-CM) - Hip pain  S32.592A (ICD-10-CM) - Fracture of multiple pubic rami, left, closed, initial encounter ([facility] [address]   Treatment Diagnosis: decreased strength, decreased mobility, impaired gait. Insurance information: PT Insurance Information: [employer]     Precautions/ Contra-indications: may have osteoporosis, low BMI. Latex Allergy:  [x]NO      []YES  Preferred Language for Healthcare:   [x]English       []Other:    C-SSRS Triggered by Intake questionnaire (Past 2 wk assessment ):   [x] No, Questionnaire did not trigger screening.   [] Yes, Patient intake triggered C-SSRS Screening     [] Completed, no further action required.    [] Completed, PCP notified via Epic    SUBJECTIVE:   Per MD note 22:  HPI: Eleuterio Benavides is a 63 y.o. female here today for  evaluation of bilateral anterior  and groin pain that has been on going for  2 week(s). She stepped up on a chair at home to reach for a cabinet, and it gave out from underneath her. She landed on her left hip. She went to ED and xrays revealed rami fracture. Assessment: Patient is a 63 y.o. female with pelvic pain consistent with an xray diagnosis of a type 1 lateral compression fracture (LC1). I suspect a concomitant in complete sacral insufficiency fracture, but no xray evidence of vertical instability at the sacrioiliac joints on xrays. We believe patient is a candidate for non operative management and close radiographic follow-up. She can be WBAT. She needs to remain off work as a  for another 4 weeks or so. I recommend OTC tylenol for pain. The would benefit from 1x/weekly PT for gentle hip/pelvis/spine conditioning and gait re-training. PT eval 8/11/22:  Patient stated complaint: Anterior hip pain and soreness into the LEs. Pt has diffiulty walking, uses crutches when needed. Sore with steps, must use railing. Balance is moderately impaired. Feels weak. Sometimes feels like she might fall again. Relevant Medical History: being tested for osteoporosis, asthma  Functional Outcome: FOTO physical FS primary measure score = 32; Risk adjusted = 61    Pain Scale: 4-5/10 on average, intermittent. Highest pain is 6/10. Easing factors: Stretching  Provocative factors: weightbearing. Type: []Constant   [x]Intermittent  []Radiating []Localized []Other:     Numbness/Tingling: none    Occupation/School:     Living Status/Prior Level of Function:Prior to this injury / incident, pt was independent with ADLs and IADLs. Completely functional without pain on a daily basis. OBJECTIVE:   Palpation: TTP ASIS, greater trochanter, iliac crest, ITC on the L. Functional Mobility/Transfers: independent however must use UE to assist in lifting L LE during bed mobility and transferring in and out of the car.      Posture: slightly excessive hip flexion in standing. Bandages/Dressings/Incisions: NA    Gait: (include devices/WB status)     Dermatomes Normal Abnormal Comments   inguinal area (L1)       anterior mid-thigh (L2)      distal ant thigh/med knee (L3)      medial lower leg and foot (L4)      lateral lower leg and foot (L5)      posterior calf (S1)      medial calcaneus (S2)          Reflexes Normal Abnormal Comments   S1-2 Seated achilles      S1-2 Prone knee bend      L3-4 Patellar tendon      Clonus      Babinski           PROM AROM    L R L R   Hip Flexion WNL      Hip Abduction       Hip ER WNL      Hip IR WNL      Knee Flexion       Knee Extension       Dorsiflexion        Plantarflexion        Inversion        Eversion            Strength (0-5) / Myotomes Left Right   Hip Flexion - supine     Hip Flexion - seated (L1-2) 3 3   Hip Abduction 3+ 3+   Hip Adduction     Hip ER     Hip IR     Quads (L2-4) 3+ 4-   Hamstrings 3+ 4-   Ankle Dorsiflexion (L4-5)     Ankle Plantarflexion (S1-2)     Ankle Inversion     Ankle Eversion (S1-2)     Great Toe Extension (L5)          Flexibility     Hamstrings (90/90)     ITB Magui [place])     Quads (Ely's)     Hip Flexor Alpheus Melanie)          Girth     Mid patella     Suprapatellar     Figure 8     Transmalleolar     Metatarsal Heads         Joint mobility:    [x]Normal    []Hypo   []Hyper    NT- known fracture  Orthopaedic Special Tests  Positive  Negative  NT Comments    Hip       FAITH / Ra's       FADIR       Scour       Trendelenburg              Knee       Lachman's / Anterior Drawer       Posterior Drawer       Varus Stress       Valgus Stress       Christiano's        Appley's       Thessaly's       Patellar Tracking              Ankle       Anterior Drawer       Talar Tilt       García       Sravanthi's                   Balance: rhomberg EC: WNL, Tandem decreased with L in front, increased hip strategies, SLB 5 sec on L limited d/t pain and balance.                          [x] Patient history, allergies, meds reviewed. Medical chart reviewed. See intake form. Review Of Systems (ROS):  [x]Performed Review of systems (Integumentary, CardioPulmonary, Neurological) by intake and observation. Intake form has been scanned into medical record. Patient has been instructed to contact their primary care physician regarding ROS issues if not already being addressed at this time.       Co-morbidities/Complexities (which will affect course of rehabilitation):   []None        []Hx of COVID   Arthritic conditions   []Rheumatoid arthritis (M05.9)  []Osteoarthritis (M19.91)  []Gout   Cardiovascular conditions   []Hypertension (I10)  []Hyperlipidemia (E78.5)  []Angina pectoris (I20)  []Atherosclerosis (I70)  []Pacemaker  []Hx of CABG/stent/  cardiac surgeries   Musculoskeletal conditions   []Disc pathology   []Congenital spine pathologies   []Osteoporosis (M81.8)  []Osteopenia (M85.8)  []Scoliosis       Endocrine conditions   []Hypothyroid (E03.9)  []Hyperthyroid Gastrointestinal conditions   []Constipation (Q88.69)   Metabolic conditions   []Morbid obesity (E66.01)  []Diabetes type 1(E10.65) or 2 (E11.65)   []Neuropathy (G60.9)     Cardio/Pulmonary conditions   []Asthma (J45)  []Coughing   []COPD (J44.9)  []CHF  []A-fib   Psychological Disorders  []Anxiety (F41.9)  []Depression (F32.9)   []Other:   Developmental Disorders  []Autism (F84.0)  []CP (G80)  []Down Syndrome (Q90.9)  []Developmental delay     Neurological conditions  []Prior Stroke (I69.30)  []Parkinson's (G20)  []Encephalopathy (G93.40)  []MS (G35)  []Post-polio (G14)  []SCI  []TBI  []ALS Other conditions  []Fibromyalgia (M79.7)  []Vertigo  []Syncope  []Kidney Failure  []Cancer      []currently undergoing                treatment  []Pregnancy  []Incontinence   Prior surgeries  []involved limb  []previous spinal surgery  [] section birth  []hysterectomy  []bowel / bladder surgery  []other relevant surgeries   []Other:              Barriers to/and or personal factors that will affect rehab potential:              [x]Age  []Sex    []Smoker              []Motivation/Lack of Motivation                        [x]Co-Morbidities              []Cognitive Function, education/learning barriers              []Environmental, home barriers              []profession/work barriers  []past PT/medical experience  []other:  Justification:     Falls Risk Assessment (30 days):   [] Falls Risk assessed and no intervention required. [x] Falls Risk assessed and Patient requires intervention due to being higher risk   TUG score (>12s at risk):     [x] Falls education provided, including cueing RW.        ASSESSMENT: Pt is a 63 yr old female presenting s/p pelvic fracture, treated conservatively. Pt is currently WBAT however has antalgic gait with poor mechanics. Pt also has significantly reduced strength B, likely also impacted by low BMI. Pt has pain with functional mobility and must lift her LE into the car and up onto the bed for bed mobility. Pt has decreased balance. Pt would benefit from skilled physical therapy to address these impairments and allow pt to return to PLOF.     Functional Impairments:     []Noted lumbar/proximal hip/LE joint hypomobility   [x]Decreased LE functional ROM   [x]Decreased core/proximal hip strength and neuromuscular control   [x]Decreased LE functional strength   [x]Reduced balance/proprioceptive control   []other:      Functional Activity Limitations (from functional questionnaire and intake)   [x]Reduced ability to tolerate prolonged functional positions   [x]Reduced ability or difficulty with changes of positions or transfers between positions   [x]Reduced ability to maintain good posture and demonstrate good body mechanics with sitting, bending, and lifting   []Reduced ability to sleep   [x] Reduced ability or tolerance with driving and/or computer work   [x]Reduced ability to perform lifting, carrying tasks   [x]Reduced ability to squat   [x]Reduced ability to forward bend   [x]Reduced ability to ambulate prolonged functional periods/distances/surfaces   [x]Reduced ability to ascend/descend stairs   [x]Reduced ability to run, hop, cut or jump   []other:    Participation Restrictions   [x]Reduced participation in self care activities   [x]Reduced participation in home management activities   [x]Reduced participation in work activities   [x]Reduced participation in social activities. [x]Reduced participation in sport/recreation activities. Classification :    []Signs/symptoms consistent with post-surgical status including decreased ROM, strength and function.    []Signs/symptoms consistent with joint sprain/strain   []Signs/symptoms consistent with patella-femoral syndrome   []Signs/symptoms consistent with knee OA/hip OA   []Signs/symptoms consistent with internal derangement of knee/Hip   []Signs/symptoms consistent with functional hip weakness/NMR control      []Signs/symptoms consistent with tendinitis/tendinosis    [x]signs/symptoms consistent with pubic fracture    []other:      Prognosis/Rehab Potential:      []Excellent   [x]Good    []Fair   []Poor    Tolerance of evaluation/treatment:    []Excellent   [x]Good    []Fair   []Poor    Physical Therapy Evaluation Complexity Justification  [x] A history of present problem with:  [] no personal factors and/or comorbidities that impact the plan of care;  [x]1-2 personal factors and/or comorbidities that impact the plan of care  []3 personal factors and/or comorbidities that impact the plan of care  [x] An examination of body systems using standardized tests and measures addressing any of the following: body structures and functions (impairments), activity limitations, and/or participation restrictions;:  [] a total of 1-2 or more elements   [x] a total of 3 or more elements   [] a total of 4 or more elements   [x] A clinical presentation with:  [x] stable and/or uncomplicated characteristics   [] evolving clinical presentation with changing characteristics  [] unstable and unpredictable characteristics;   [x] Clinical decision making of [x] low, [] moderate, [] high complexity using standardized patient assessment instrument and/or measurable assessment of functional outcome. [x] EVAL (LOW) 55899 (typically 15 minutes face-to-face)  [] EVAL (MOD) 04044 (typically 30 minutes face-to-face)  [] EVAL (HIGH) 66151 (typically 45 minutes face-to-face)  [] RE-EVAL     PLAN:   Frequency/Duration:  1-2 days per week for 4-6 Weeks:  Interventions:  [x]  Therapeutic exercise including: strength training, ROM, for Lower extremity and core   [x]  NMR activation and proprioception for LE, Glutes and Core   [x]  Manual therapy as indicated for LE, Hip and spine to include: Dry Needling/IASTM, STM, PROM, Gr I-IV mobilizations, manipulation. [x] Modalities as needed that may include: thermal agents, E-stim, Biofeedback, US, iontophoresis as indicated  [x] Patient education on joint protection, postural re-education, activity modification, progression of HEP. HEP instruction: Written HEP instructions provided and reviewed     GOALS:  Patient stated goal: Get strong again, and return to her normal self  [] Progressing: [] Met: [] Not Met: [] Adjusted    Therapist goals for Patient:   Short Term Goals: To be achieved in: 2 weeks  1. Independent in HEP and progression per patient tolerance, in order to prevent re-injury. [] Progressing: [] Met: [] Not Met: [] Adjusted  2. Patient will have a decrease in pain to facilitate improvement in movement, function, and ADLs as indicated by Functional Deficits. [] Progressing: [] Met: [] Not Met: [] Adjusted    Long Term Goals: To be achieved in: 4-6 weeks  1. FOTO score of at least 62 (in 15 sessions) to assist with reaching prior level of function. [] Progressing: [] Met: [] Not Met: [] Adjusted  2.  Patient will demonstrate an increase in Strength to at least  as well as good proximal hip strength and control to allow for proper functional mobility as indicated by patients Functional Deficits. [] Progressing: [] Met: [] Not Met: [] Adjusted  3. Patient will return to functional activities including walking 100' independently with normal gait mechanics and without increased symptoms or restriction. [] Progressing: [] Met: [] Not Met: [] Adjusted  4. Pt will complete bed mobility independently without the need to lift the L LE with her UEs.   [] Progressing: [] Met: [] Not Met: [] Adjusted     Electronically signed by:  Jose Cameron, PT, DPT

## 2022-08-17 ENCOUNTER — APPOINTMENT (OUTPATIENT)
Dept: PHYSICAL THERAPY | Age: 62
End: 2022-08-17
Payer: COMMERCIAL

## 2022-08-24 ENCOUNTER — APPOINTMENT (OUTPATIENT)
Dept: PHYSICAL THERAPY | Age: 62
End: 2022-08-24
Payer: COMMERCIAL

## 2022-10-03 ENCOUNTER — TELEPHONE (OUTPATIENT)
Dept: ORTHOPEDIC SURGERY | Age: 62
End: 2022-10-03

## 2022-10-03 ENCOUNTER — OFFICE VISIT (OUTPATIENT)
Dept: ORTHOPEDIC SURGERY | Age: 62
End: 2022-10-03
Payer: COMMERCIAL

## 2022-10-03 VITALS — BODY MASS INDEX: 15.46 KG/M2 | HEIGHT: 62 IN | WEIGHT: 84 LBS

## 2022-10-03 DIAGNOSIS — S32.592A FRACTURE OF MULTIPLE PUBIC RAMI, LEFT, CLOSED, INITIAL ENCOUNTER (HCC): Primary | ICD-10-CM

## 2022-10-03 PROCEDURE — 1036F TOBACCO NON-USER: CPT | Performed by: ORTHOPAEDIC SURGERY

## 2022-10-03 PROCEDURE — G8484 FLU IMMUNIZE NO ADMIN: HCPCS | Performed by: ORTHOPAEDIC SURGERY

## 2022-10-03 PROCEDURE — G8427 DOCREV CUR MEDS BY ELIG CLIN: HCPCS | Performed by: ORTHOPAEDIC SURGERY

## 2022-10-03 PROCEDURE — 3017F COLORECTAL CA SCREEN DOC REV: CPT | Performed by: ORTHOPAEDIC SURGERY

## 2022-10-03 PROCEDURE — 99214 OFFICE O/P EST MOD 30 MIN: CPT | Performed by: ORTHOPAEDIC SURGERY

## 2022-10-03 PROCEDURE — G8419 CALC BMI OUT NRM PARAM NOF/U: HCPCS | Performed by: ORTHOPAEDIC SURGERY

## 2022-10-03 NOTE — LETTER
[facility]  [id number]  [address] [address]  Phone: [phone]  Fax: [phone]    Marciano Pablo MD    October 5, 2022     Andree Reyes, [address]    Patient: Warren Rojas   MR Number: [id number]   YOB: 1960   Date of Visit: 10/3/2022       Dear Andree Reyes: Thank you for referring Warren Rojas to me for evaluation/treatment. Below are the relevant portions of my assessment and plan of care. If you have questions, please do not hesitate to call me. I look forward to following Renee Patrick along with you.     Sincerely,      Marciano Pablo MD

## 2022-10-03 NOTE — TELEPHONE ENCOUNTER
General Question     Subject: patient call and would like to know if she can get a copy of her xray that was done today of her lt hip-pelvis can be sent over through the system she would need them before Thursday 10/06/22 to be going over to Dr. Cecily Pink in the [place] [address]. She would need a call back regarding this matter. Please Advise. .   Patient Ambrosio Cristobal Number: [phone]

## 2022-10-03 NOTE — PROGRESS NOTES
Date:  10/3/2022    Name:  Fredis Ziegler  Address:  [address] [facility] [facility] [address]    :  1960      Age:   63 y.o.    SSN:  [id number]      Medical Record Number:  [id number]    Reason for Visit:    Chief Complaint    Hip Pain (F/U LEFT HIP)      DOS:10/3/2022     HPI: Fredis Ziegler is a 63 y.o. female here today for re-evaluation of her left hip pain. She continues to have anterior hip and groin pain. This has been ongoing for the past 4+ months. Her pain is due to a fall that occurred in 2022, causing her to land directly on her left hip. She denies any new injuries or traumas to her hip but feels overall she is not improving. The patient denies any bowel/bladder symptoms, or any numbness, tingling, or weakness down the affected thigh or leg. The patient denies any prior hip injuries, surgeries, or any childhood history of hip disorders. Pain Assessment  Location of Pain: Hip  Location Modifiers: Left  Severity of Pain: 5  Quality of Pain: Throbbing  Frequency of Pain: Constant  Limiting Behavior: Some  Relieving Factors: Rest, Ice  Result of Injury: Yes  Work-Related Injury: No  Are there other pain locations you wish to document?: No  ROS: Review of systems reviewed from Patient History Form completed today and available in the patient's chart under the Media tab. No past medical history on file.      Past Surgical History:   Procedure Laterality Date     SECTION      4 c-sections       Family History   Problem Relation Age of Onset    High Blood Pressure Mother     Heart Disease Father     High Blood Pressure Maternal Grandmother     High Blood Pressure Maternal Grandfather        Social History     Socioeconomic History    Marital status:      Spouse name: None    Number of children: None    Years of education: None    Highest education level: None   Tobacco Use    Smoking status: Never    Smokeless tobacco: Never   Vaping Use    Vaping Use: Never used Substance and Sexual Activity    Alcohol use: Not Currently    Drug use: Never       Current Outpatient Medications   Medication Sig Dispense Refill    vitamin D3 (CHOLECALCIFEROL) 25 MCG (1000 UT) TABS tablet Take 1 tablet by mouth in the morning. 90 tablet 1     No current facility-administered medications for this visit. Allergies   Allergen Reactions    Penicillins        Vital signs:  Ht 5' 2\" (1.575 m)   Wt 84 lb (38.1 kg)   BMI 15.36 kg/m²        Constitutional: The physical examination finds the patient to be well-developed and well-nourished. The patient is alert and oriented x3 and was cooperative throughout the visit. Neuro: no focal deficits noted. Normal mood, judgement, decision making  Eyes: sclera clear, EOMI  Ears: Normal external ear  Mouth:  No perioral lesions  Pulm: Respirations unlabored and regular  Pulse: Extremities well perfused, warm, capillary refill < 2 seconds  Musculoskeletal:      Hip Examination: bilateral    Skin/Inspection: No skin lesions, cellulitis, or extreme edema in the lower extremities. Standing/Walking: abnormal: antalgic gait, positive Trendelenburg sign. Supine/Side Lying Exam: tender with pelvic lateral compression   full range with pain  FADIR Negative  FAITH Negative  Resisted Abduction  4/5   Resisted Adduction  5/5   Resisted  Flexion  5/5    not tender at greater trochanter  Ko Test: normal    Prone: Absent  hip flexion contracture   Non tender to the proximal hamstring   Tender to the left  sacro-iliac joints      Distal Neurovascular exam is intact (foot sensation, pulses, and motor exam)       Diagnostics:  Radiology:     Pertinent imaging reviewed, images only - no report available. Radiographs were obtained and reviewed in the office; 3 views: AP Pelvis and bilateral hip 45-deg Mcnair View, and left False Profile View    Impression: healing superior ramus fracture left pelvis, but in a shortened position.       Assessment: Patient is a 63 y.o. female with continued left hip pain. This is secondary to a healed sacral insufficiency fracture and malunion superior ramus fracture left side. Impression:  Visit Diagnoses         Codes    Fracture of multiple pubic rami, left, closed, initial encounter [facility])    -  Primary S32.592A            Office Procedures:  No orders of the defined types were placed in this encounter. Orders Placed This Encounter   Procedures    XR HIP 2-3 VW W PELVIS LEFT     Standing Status:   Future     Number of Occurrences:   1     Standing Expiration Date:   10/3/2022     Scheduling Instructions:      ROOM 1    External Referral To Orthopedic Surgery     Referral Priority:   Routine     Referral Type:   Eval and Treat     Referral Reason:   Specialty Services Required     Referred to Provider:   Tanna Almaraz MD     Requested Specialty:   Orthopedic Surgery     Number of Visits Requested:   1       Plan: At this time I would like to refer the patient to Dr. Cathy Hayes at [facility]. I will defer to his expertise regarding the need for surgery. A referral was made today and given to the patient for scheduling purposes. I will follow up with the patient in 3 months if the patient does not require surgery. Otherwise, I will follow up with her as needed. All the patient's questions were answered while in the clinic. The patient is understanding of all instructions and agrees with the plan. Approximately 30 minutes was spent on patient education and coordinating care. Follow up in: Return in about 3 months (around 1/3/2023), or if symptoms worsen or fail to improve. Sincerely,    I, Lady Hernandez, am scribing for Dr. Nga Jesus MD.  10/03/22 2:23 PM Lady Hernandez. The physical examination was performed between the patient and Dr. Nga Jesus MD.  All counseling during the appointment was performed between the patient and the provider. Selina Luther MD [address] [facility]   [place] [address] [place] [address], [address]  Email: [email or web address]  Office: [phone]      10/03/22  2:23 PM    This dictation was performed with a verbal recognition program (DRAGON) and it was checked for errors. It is possible that there are still dictated errors within this office note. If so, please bring any errors to my attention for an addendum. All efforts were made to ensure that this office note is accurate.

## 2022-10-04 ENCOUNTER — TELEPHONE (OUTPATIENT)
Dept: ORTHOPEDIC SURGERY | Age: 62
End: 2022-10-04

## 2022-10-04 NOTE — TELEPHONE ENCOUNTER
Pushed 10/3/22 and 7/20/22 images thru PACS to [address] [place] [address]. Will call the patient regarding the request for images on a cd to see what office she would like to  at.

## 2023-01-23 ENCOUNTER — OFFICE VISIT (OUTPATIENT)
Dept: ORTHOPEDIC SURGERY | Age: 63
End: 2023-01-23

## 2023-01-23 VITALS — RESPIRATION RATE: 12 BRPM | WEIGHT: 84 LBS | HEIGHT: 62 IN | BODY MASS INDEX: 15.46 KG/M2

## 2023-01-23 DIAGNOSIS — S32.592D CLOSED FRACTURE OF MULTIPLE RAMI OF LEFT PUBIS WITH ROUTINE HEALING, SUBSEQUENT ENCOUNTER: Primary | ICD-10-CM

## 2023-01-23 PROCEDURE — 99213 OFFICE O/P EST LOW 20 MIN: CPT

## 2023-01-23 NOTE — PROGRESS NOTES
Date:  2023    Name:  Fatimah Alonzo  Address:  [address] [address]    :  1960      Age:   63 y.o.    SSN:  [id number]      Medical Record Number:  [id number]    Reason for Visit:    Chief Complaint    Hip Pain (F/u left hip. Seen by  Ortho per Dr. Ida Fullre request on 10/6/22. Notes doing better.  )      DOS:2023     HPI: Gilma Gonzales is a 63 y.o. female here today for re-evaluation of her left hip pain secondary to healed sacral insufficiency fracture and malunion superior ramus fracture left side. At last office visit she was referred to see Dr. Tolu Weston at [facility] who recommended CT scan to further evaluate for her malunion. She did not complete this scan as she is no longer having pain. She feels much improved at today's office visit. She denies any symptoms at this time. The patient denies any bowel/bladder symptoms, or any numbness, tingling, or weakness down the affected thigh or leg. The patient denies any prior hip injuries, surgeries, or any childhood history of hip disorders. ROS: Review of systems reviewed from Patient History Form completed today and available in the patient's chart under the Media tab. History reviewed. No pertinent past medical history.      Past Surgical History:   Procedure Laterality Date     SECTION      4 c-sections       Family History   Problem Relation Age of Onset    High Blood Pressure Mother     Heart Disease Father     High Blood Pressure Maternal Grandmother     High Blood Pressure Maternal Grandfather        Social History     Socioeconomic History    Marital status:      Spouse name: None    Number of children: None    Years of education: None    Highest education level: None   Tobacco Use    Smoking status: Never    Smokeless tobacco: Never   Vaping Use    Vaping Use: Never used   Substance and Sexual Activity    Alcohol use: Not Currently    Drug use: Never       Current Outpatient Medications Medication Sig Dispense Refill    vitamin D3 (CHOLECALCIFEROL) 25 MCG (1000 UT) TABS tablet Take 1 tablet by mouth in the morning. 90 tablet 1     No current facility-administered medications for this visit. Allergies   Allergen Reactions    Penicillins        Vital signs:  Resp 12   Ht 5' 2\" (1.575 m)   Wt 84 lb (38.1 kg)   BMI 15.36 kg/m²        Constitutional: The physical examination finds the patient to be well-developed and well-nourished. The patient is alert and oriented x3 and was cooperative throughout the visit. Neuro: no focal deficits noted. Normal mood, judgement, decision making  Eyes: sclera clear, EOMI  Ears: Normal external ear  Mouth:  No perioral lesions  Pulm: Respirations unlabored and regular  Pulse: Extremities well perfused, warm, capillary refill < 2 seconds  Musculoskeletal:      Hip Examination: bilateral    Skin/Inspection: No skin lesions, cellulitis, or extreme edema in the lower extremities. Standing/Walking: abnormal: antalgic gait, positive Trendelenburg sign. Supine/Side Lying Exam:   full range with pain  FADIR Negative  FAITH Negative  Resisted Abduction  4/5   Resisted Adduction  4/5   Resisted  Flexion  4/5    not tender at greater trochanter  Ko Test: normal    Prone: Absent  hip flexion contracture   Non tender to the proximal hamstring   Non tender  sacro-iliac joints      Distal Neurovascular exam is intact (foot sensation, pulses, and motor exam)       Diagnostics:  Radiology:     Pertinent imaging reviewed, images only - no report available. Radiographs were obtained and reviewed in the office; 2 views, AP and frog-leg lateral    Impression: healing superior ramus fracture left pelvis, but in a shortened position. Assessment: Patient is a 63 y.o. female who is feeling much better 6 months status post fall and healed sacral insufficiency fracture and malunion superior ramus fracture left side.   She did see Dr. Ruy Ruiz at [facility] who recommended CT scan. However since patient is feeling much better she would rather not have the scan completed at this time. Impression:  Visit Diagnoses         Codes    Closed fracture of multiple rami of left pubis with routine healing, subsequent encounter    -  Primary S32.592D            Office Procedures:  No orders of the defined types were placed in this encounter. Orders Placed This Encounter   Procedures    XR HIP 2-3 VW W PELVIS LEFT     ROOM 2     Standing Status:   Future     Number of Occurrences:   1     Standing Expiration Date:   1/23/2024       Plan  Lissa Me is feeling much better. She is inquiring today about returning to work. I will provide her with a note that she may return to work with no heavy lifting from a bent over position. Should her pain return, her neck step would be to have a CT scan and follow-up with Dr. Lina Baron. We did discuss this in detail today. All the patient's questions were answered while in the clinic. The patient is understanding of all instructions and agrees with the plan. Approximately 30 minutes was spent on patient education and coordinating care. Follow up in: No follow-ups on file. Sincerely,  Tyra Gardner      01/23/23  6:52 PM    This dictation was performed with a verbal recognition program (DRAGON) and it was checked for errors. It is possible that there are still dictated errors within this office note. If so, please bring any errors to my attention for an addendum. All efforts were made to ensure that this office note is accurate.

## 2023-01-23 NOTE — LETTER
[facility]  [address] [address]  Phone: [phone]  Fax: [phone]    Tyra Gardner        January 23, 2023     Patient: Terry Aguilar   YOB: 1960   Date of Visit: 1/23/2023       To Whom It May Concern: It is my medical opinion that Hector Lyn may return to full duty. She is restricted from lifting anything over 20 lbs from the ground. If you have any questions or concerns, please don't hesitate to call.     Sincerely,        Tyra Gardner

## 2024-07-09 ENCOUNTER — APPOINTMENT (OUTPATIENT)
Dept: CT IMAGING | Age: 64
End: 2024-07-09

## 2024-07-09 ENCOUNTER — HOSPITAL ENCOUNTER (EMERGENCY)
Age: 64
Discharge: HOME | End: 2024-07-09
Attending: STUDENT IN AN ORGANIZED HEALTH CARE EDUCATION/TRAINING PROGRAM

## 2024-07-09 VITALS
RESPIRATION RATE: 18 BRPM | SYSTOLIC BLOOD PRESSURE: 109 MMHG | TEMPERATURE: 96.5 F | HEART RATE: 81 BPM | OXYGEN SATURATION: 97 % | HEIGHT: 60 IN | DIASTOLIC BLOOD PRESSURE: 71 MMHG | WEIGHT: 88 LBS | BODY MASS INDEX: 17.28 KG/M2

## 2024-07-09 DIAGNOSIS — D64.9 ANEMIA, UNSPECIFIED TYPE: ICD-10-CM

## 2024-07-09 DIAGNOSIS — H53.8 BLURRED VISION: Primary | ICD-10-CM

## 2024-07-09 DIAGNOSIS — R74.8 ELEVATED ALKALINE PHOSPHATASE LEVEL: ICD-10-CM

## 2024-07-09 LAB
ALBUMIN SERPL-MCNC: 3.9 G/DL (ref 3.4–5)
ALBUMIN/GLOB SERPL: 1.6 {RATIO} (ref 1.1–2.2)
ALP SERPL-CCNC: 261 U/L (ref 40–129)
ALT SERPL-CCNC: 30 U/L (ref 10–40)
ANION GAP SERPL CALCULATED.3IONS-SCNC: 8 MMOL/L (ref 3–16)
AST SERPL-CCNC: 15 U/L (ref 15–37)
BASOPHILS # BLD: 0 K/UL (ref 0–0.2)
BASOPHILS NFR BLD: 0.4 %
BILIRUB SERPL-MCNC: <0.2 MG/DL (ref 0–1)
BUN SERPL-MCNC: 13 MG/DL (ref 7–20)
CALCIUM SERPL-MCNC: 8.2 MG/DL (ref 8.3–10.6)
CHLORIDE SERPL-SCNC: 104 MMOL/L (ref 99–110)
CO2 SERPL-SCNC: 26 MMOL/L (ref 21–32)
CREAT SERPL-MCNC: 0.7 MG/DL (ref 0.6–1.2)
DEPRECATED RDW RBC AUTO: 15.4 % (ref 12.4–15.4)
EOSINOPHIL # BLD: 0 K/UL (ref 0–0.6)
EOSINOPHIL NFR BLD: 0.5 %
FLUAV RNA RESP QL NAA+PROBE: NOT DETECTED
FLUBV RNA RESP QL NAA+PROBE: NOT DETECTED
GFR SERPLBLD CREATININE-BSD FMLA CKD-EPI: >90 ML/MIN/{1.73_M2}
GLUCOSE SERPL-MCNC: 76 MG/DL (ref 70–99)
HCT VFR BLD AUTO: 27.3 % (ref 36–48)
HGB BLD-MCNC: 8.7 G/DL (ref 12–16)
LYMPHOCYTES # BLD: 0.7 K/UL (ref 1–5.1)
LYMPHOCYTES NFR BLD: 17.2 %
MCH RBC QN AUTO: 30.3 PG (ref 26–34)
MCHC RBC AUTO-ENTMCNC: 32 G/DL (ref 31–36)
MCV RBC AUTO: 94.5 FL (ref 80–100)
MONOCYTES # BLD: 0.3 K/UL (ref 0–1.3)
MONOCYTES NFR BLD: 7.8 %
NEUTROPHILS # BLD: 2.9 K/UL (ref 1.7–7.7)
NEUTROPHILS NFR BLD: 74.1 %
PLATELET # BLD AUTO: 157 K/UL (ref 135–450)
PMV BLD AUTO: 8.6 FL (ref 5–10.5)
POTASSIUM SERPL-SCNC: 4.3 MMOL/L (ref 3.5–5.1)
PROT SERPL-MCNC: 6.3 G/DL (ref 6.4–8.2)
RBC # BLD AUTO: 2.89 M/UL (ref 4–5.2)
SARS-COV-2 RNA RESP QL NAA+PROBE: NOT DETECTED
SODIUM SERPL-SCNC: 138 MMOL/L (ref 136–145)
WBC # BLD AUTO: 3.9 K/UL (ref 4–11)

## 2024-07-09 PROCEDURE — 99285 EMERGENCY DEPT VISIT HI MDM: CPT

## 2024-07-09 PROCEDURE — 70498 CT ANGIOGRAPHY NECK: CPT

## 2024-07-09 PROCEDURE — 80053 COMPREHEN METABOLIC PANEL: CPT

## 2024-07-09 PROCEDURE — 6360000004 HC RX CONTRAST MEDICATION: Performed by: PHYSICIAN ASSISTANT

## 2024-07-09 PROCEDURE — 70450 CT HEAD/BRAIN W/O DYE: CPT

## 2024-07-09 PROCEDURE — 87636 SARSCOV2 & INF A&B AMP PRB: CPT

## 2024-07-09 PROCEDURE — 85025 COMPLETE CBC W/AUTO DIFF WBC: CPT

## 2024-07-09 RX ORDER — PROPARACAINE HYDROCHLORIDE 5 MG/ML
SOLUTION/ DROPS OPHTHALMIC
Status: DISCONTINUED
Start: 2024-07-09 | End: 2024-07-09 | Stop reason: HOSPADM

## 2024-07-09 RX ADMIN — IOPAMIDOL 75 ML: 755 INJECTION, SOLUTION INTRAVENOUS at 15:39

## 2024-07-09 ASSESSMENT — VISUAL ACUITY
OU: 20/50
OU: 1
OD: 20/50
OS: 20/40

## 2024-07-09 ASSESSMENT — ENCOUNTER SYMPTOMS
EYE PAIN: 1
NAUSEA: 0
COUGH: 0
SORE THROAT: 0
RHINORRHEA: 0
ABDOMINAL PAIN: 0
EYE REDNESS: 0
EYE ITCHING: 0
VOMITING: 0
BACK PAIN: 0
SHORTNESS OF BREATH: 0
STRIDOR: 0
DIARRHEA: 0

## 2024-07-09 ASSESSMENT — PAIN - FUNCTIONAL ASSESSMENT: PAIN_FUNCTIONAL_ASSESSMENT: NONE - DENIES PAIN

## 2024-07-09 ASSESSMENT — LIFESTYLE VARIABLES
HOW MANY STANDARD DRINKS CONTAINING ALCOHOL DO YOU HAVE ON A TYPICAL DAY: PATIENT DOES NOT DRINK
HOW OFTEN DO YOU HAVE A DRINK CONTAINING ALCOHOL: NEVER

## 2024-07-10 ASSESSMENT — ENCOUNTER SYMPTOMS: EYE DISCHARGE: 1
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
